# Patient Record
Sex: FEMALE | Race: WHITE | Employment: FULL TIME | URBAN - NONMETROPOLITAN AREA
[De-identification: names, ages, dates, MRNs, and addresses within clinical notes are randomized per-mention and may not be internally consistent; named-entity substitution may affect disease eponyms.]

---

## 2020-10-09 SDOH — HEALTH STABILITY: MENTAL HEALTH: HOW OFTEN DO YOU HAVE A DRINK CONTAINING ALCOHOL?: NEVER

## 2021-01-12 ENCOUNTER — VIRTUAL VISIT (OUTPATIENT)
Dept: OBGYN CLINIC | Age: 41
End: 2021-01-12
Payer: COMMERCIAL

## 2021-01-12 DIAGNOSIS — N93.9 ABNORMAL UTERINE AND VAGINAL BLEEDING, UNSPECIFIED: Primary | ICD-10-CM

## 2021-01-12 PROCEDURE — 99442 PR PHYS/QHP TELEPHONE EVALUATION 11-20 MIN: CPT | Performed by: NURSE PRACTITIONER

## 2021-01-12 NOTE — PROGRESS NOTES
Robyn Wagoner is a 47 y.o. female evaluated via telephone on 1/12/2021. Consent:  She and/or health care decision maker is aware that that she may receive a bill for this telephone service, depending on her insurance coverage, and has provided verbal consent to proceed: Yes      Documentation:  I communicated with the patient and/or health care decision maker about prolonged, heavy menses. Details of this discussion including any medical advice provided: Patient reports prolonged, heavy menses since delivering in April. States this past month she only had 4 days where she did not have some sort of bleeding. Amount of bleeding varies from dark brown spotting to saturating super plus tampons every 3 hours. Reports several occassions where she has had to miss work due to bleeding/cramping. Patient reports she had previously discussed Cesar Watts with Dr. Praveen Dozier and would like to move forth with surgery at this time. Patient is agreeable to proceed with pelvic usn and follow up after resolution of Covid. I affirm this is a Patient Initiated Episode with a Patient who has not had a related appointment within my department in the past 7 days or scheduled within the next 24 hours.     Patient identification was verified at the start of the visit: Yes    Total Time: minutes: 11-20 minutes    Note: not billable if this call serves to triage the patient into an appointment for the relevant concern      Deepika Aguilar

## 2022-08-22 ENCOUNTER — OFFICE VISIT (OUTPATIENT)
Dept: OBGYN CLINIC | Age: 42
End: 2022-08-22
Payer: COMMERCIAL

## 2022-08-22 VITALS — SYSTOLIC BLOOD PRESSURE: 124 MMHG | WEIGHT: 219.5 LBS | DIASTOLIC BLOOD PRESSURE: 72 MMHG

## 2022-08-22 DIAGNOSIS — N93.9 ABNORMAL UTERINE AND VAGINAL BLEEDING, UNSPECIFIED: Primary | ICD-10-CM

## 2022-08-22 PROCEDURE — 99213 OFFICE O/P EST LOW 20 MIN: CPT | Performed by: NURSE PRACTITIONER

## 2022-08-22 RX ORDER — DEXTROAMPHETAMINE SACCHARATE, AMPHETAMINE ASPARTATE MONOHYDRATE, DEXTROAMPHETAMINE SULFATE AND AMPHETAMINE SULFATE 2.5; 2.5; 2.5; 2.5 MG/1; MG/1; MG/1; MG/1
CAPSULE, EXTENDED RELEASE ORAL
COMMUNITY
Start: 2022-08-05

## 2022-08-22 NOTE — PROGRESS NOTES
PROBLEM VISIT     Date of service: 2022    Jurgen Lewis  Is a 38 y.o. female    PT's PCP is: Michelle Currie     : 1980                                             Subjective:       Patient's last menstrual period was 2022. OB History    Para Term  AB Living   8 6 1     5   SAB IAB Ectopic Molar Multiple Live Births             5      # Outcome Date GA Lbr Abdifatah/2nd Weight Sex Delivery Anes PTL Lv   8 Term      CS-LTranv   SAM   7             6             5 Para            4 Para            3 Para            2 Para            1 Para                 Social History     Tobacco Use   Smoking Status Never   Smokeless Tobacco Never        Social History     Substance and Sexual Activity   Alcohol Use Never       Allergies: Tree nut [macadamia nut oil], Adhesive tape, Azithromycin, Ceftriaxone, Aspirin, Influenza vaccines, Morphine, Sesame oil, Toradol [ketorolac tromethamine], Augmentin [amoxicillin-pot clavulanate], Ciprofloxacin, Hydrocodone-acetaminophen, and Zosyn [piperacillin sod-tazobactam so]      Current Outpatient Medications:     Multiple Vitamin (MULTIVITAMIN ADULT PO), Take by mouth, Disp: , Rfl:     amphetamine-dextroamphetamine (ADDERALL XR) 10 MG extended release capsule, TAKE 1 CAPSULE BY MOUTH IN THE MORNING AS NEEDED, Disp: , Rfl:     Social History     Substance and Sexual Activity   Sexual Activity Yes    Partners: Male     Chief Complaint   Patient presents with    Menstrual Problem     C/O prolonged, heavy bleeding. LMP lasted 20 days. Discuss options. PE:  Vital Signs  Blood pressure 124/72, weight 219 lb 8 oz (99.6 kg), last menstrual period 2022. HPI: Patient presents today with complaints of prolonged, heavy menses. Reports LMP lasted 20 days; flow was heavy the duration with clotting and cramping. Reports menstrual change have been present since last delivery in early .      PT denies fever, chills, nausea and vomiting       Review of Systems   Constitutional: Negative. Respiratory:  Negative for shortness of breath. Cardiovascular:  Negative for palpitations. Genitourinary:  Positive for menstrual problem. Negative for difficulty urinating, dyspareunia, dysuria, frequency, pelvic pain, vaginal bleeding, vaginal discharge and vaginal pain. Physical Exam  Constitutional:       Appearance: Normal appearance. HENT:      Head: Normocephalic. Pulmonary:      Effort: Pulmonary effort is normal.   Musculoskeletal:         General: Normal range of motion. Neurological:      General: No focal deficit present. Mental Status: She is alert. Psychiatric:         Mood and Affect: Mood normal.         Behavior: Behavior normal.       Assessment and Plan          Diagnosis Orders   1. Abnormal uterine and vaginal bleeding, unspecified            reviewed possible differentials. Discussed usn and follow up with possible need for endometrial bx due to prolonged nature of bleeding and age. Discussed options for management of menses (states she discussed TLH in the past with Dr. Criselda Bumpers)- patient is not interested in hormonal cycle control. Ablation and TLH pamphlet provided. Will consider options. I am having Faraz Acevedo. Masters maintain her amphetamine-dextroamphetamine and Multiple Vitamin (MULTIVITAMIN ADULT PO). Return for gyn US. There are no Patient Instructions on file for this visit.     Time spent 20 minutes      DEEPIKA Campa NP,8/29/2022 7:49 AM

## 2022-08-29 ASSESSMENT — ENCOUNTER SYMPTOMS: SHORTNESS OF BREATH: 0

## 2022-08-31 ENCOUNTER — OFFICE VISIT (OUTPATIENT)
Dept: OBGYN CLINIC | Age: 42
End: 2022-08-31
Payer: COMMERCIAL

## 2022-08-31 VITALS
WEIGHT: 220 LBS | SYSTOLIC BLOOD PRESSURE: 110 MMHG | HEIGHT: 63 IN | DIASTOLIC BLOOD PRESSURE: 79 MMHG | BODY MASS INDEX: 38.98 KG/M2

## 2022-08-31 DIAGNOSIS — Z80.49 FAMILY HISTORY OF UTERINE CANCER: ICD-10-CM

## 2022-08-31 DIAGNOSIS — N80.03 ADENOMYOSIS OF UTERUS: ICD-10-CM

## 2022-08-31 DIAGNOSIS — N93.9 ABNORMAL UTERINE AND VAGINAL BLEEDING, UNSPECIFIED: Primary | ICD-10-CM

## 2022-08-31 DIAGNOSIS — N94.6 DYSMENORRHEA: ICD-10-CM

## 2022-08-31 PROCEDURE — 99213 OFFICE O/P EST LOW 20 MIN: CPT | Performed by: NURSE PRACTITIONER

## 2022-08-31 ASSESSMENT — ENCOUNTER SYMPTOMS: SHORTNESS OF BREATH: 0

## 2022-09-01 ENCOUNTER — HOSPITAL ENCOUNTER (OUTPATIENT)
Age: 42
Setting detail: SPECIMEN
Discharge: HOME | End: 2022-09-01

## 2022-09-01 ENCOUNTER — PROCEDURE VISIT (OUTPATIENT)
Dept: OBGYN CLINIC | Age: 42
End: 2022-09-01
Payer: COMMERCIAL

## 2022-09-01 DIAGNOSIS — N93.9 ABNORMAL UTERINE AND VAGINAL BLEEDING, UNSPECIFIED: Primary | ICD-10-CM

## 2022-09-01 LAB
CONTROL: NORMAL
PREGNANCY TEST URINE, POC: NEGATIVE

## 2022-09-01 PROCEDURE — 58100 BIOPSY OF UTERUS LINING: CPT | Performed by: NURSE PRACTITIONER

## 2022-09-01 PROCEDURE — 81025 URINE PREGNANCY TEST: CPT | Performed by: NURSE PRACTITIONER

## 2022-09-01 NOTE — PROGRESS NOTES
PROBLEM VISIT     Date of service: 2022    Tyrese Worrell  Is a 38 y.o. female    PT's PCP is: Jarvis Mclean     : 1980                                             Subjective:       Patient's last menstrual period was 2022. OB History    Para Term  AB Living   8 6 1     5   SAB IAB Ectopic Molar Multiple Live Births             5      # Outcome Date GA Lbr Abdifatah/2nd Weight Sex Delivery Anes PTL Lv   8 Term      CS-LTranv   SAM   7             6             5 Para            4 Para            3 Para            2 Para            1 Para                 Social History     Tobacco Use   Smoking Status Never   Smokeless Tobacco Never        Social History     Substance and Sexual Activity   Alcohol Use Never       Allergies: Tree nut [macadamia nut oil], Adhesive tape, Azithromycin, Ceftriaxone, Aspirin, Influenza vaccines, Morphine, Sesame oil, Toradol [ketorolac tromethamine], Augmentin [amoxicillin-pot clavulanate], Ciprofloxacin, Hydrocodone-acetaminophen, and Zosyn [piperacillin sod-tazobactam so]      Current Outpatient Medications:     amphetamine-dextroamphetamine (ADDERALL XR) 10 MG extended release capsule, TAKE 1 CAPSULE BY MOUTH IN THE MORNING AS NEEDED, Disp: , Rfl:     Multiple Vitamin (MULTIVITAMIN ADULT PO), Take by mouth, Disp: , Rfl:     Social History     Substance and Sexual Activity   Sexual Activity Yes    Partners: Male     Chief Complaint   Patient presents with    Follow-up     Follow up usn for AUB. Reports severe cramping and taking Motrin around the clock today. PE:  Vital Signs  Blood pressure 110/79, height 5' 3\" (1.6 m), weight 220 lb (99.8 kg), last menstrual period 2022. HPI: Patient here following ultrasound for AUB. Reports started menses again today; only had 9 days of no bleeding in between menses. Significant cramping associated with menses.      T denies fever, chills, nausea and vomiting       Review of Systems Constitutional: Negative. Respiratory:  Negative for shortness of breath. Cardiovascular:  Negative for chest pain and palpitations. Genitourinary:  Positive for menstrual problem. Negative for difficulty urinating, frequency, vaginal discharge and vaginal pain. Neurological:  Negative for syncope and light-headedness. Physical Exam  Constitutional:       Appearance: Normal appearance. HENT:      Head: Normocephalic. Pulmonary:      Effort: Pulmonary effort is normal.   Musculoskeletal:         General: Normal range of motion. Neurological:      General: No focal deficit present. Mental Status: She is alert. Psychiatric:         Mood and Affect: Mood normal.         Behavior: Behavior normal.     Uterus measures: 10.3 x 6.9  x 7.4 cm, heterogenous appearing uterus  Endometrium measures: 8.5 mm  Right ovary appears WNL  Left ovary appears WNL    Assessment and Plan          Diagnosis Orders   1. Abnormal uterine and vaginal bleeding, unspecified        2. Dysmenorrhea        3. Adenomyosis of uterus        4. Family history of uterine cancer            ultrasound reviewed with patient   Endometrial bx recommended due to bleeding pattern, age and family hx of uterine cancer. Patient originally was leaning towards ablation for cycle control but now desires TLH without BSO as she is concerned with family history of cancer. I am having Real Bump. Masters maintain her amphetamine-dextroamphetamine and Multiple Vitamin (MULTIVITAMIN ADULT PO). Return for endo bx-. There are no Patient Instructions on file for this visit.     Time spent 20 minutes      DEEPIKA Saucedo NP,8/31/2022 9:32 PM

## 2022-09-01 NOTE — PROGRESS NOTES
Vero Rivera, A 38y.o. year old female presents to the office for an Endometrial Biopsy. LMP 08/31/2022     Reason For Biopsy: AUB    EMB: performed today - procedure explained to pt - verbal consent obtained     Procedure:      A speculum was paced in vaginal canal and the cervix was visualized. The cervix was then prepped with betadine x 3. A tenaculum was used. The uterus was measured to be 10 cm. Pipette used to obtain specimen and the specimen will be sent for pathology. A large amount of tissue was collected. Pt tolerated the procedure well. Assessment:          Plan:   Warning signs reviewed such as cramping and spotting. Encouraged to use ibuprofen 800 mg TID PRN and heat for comfort. Will call patient with biopsy results.    Anticipate follow up with: results and TLH

## 2022-09-06 LAB — SURGICAL PATHOLOGY REPORT: NORMAL

## 2022-09-19 ENCOUNTER — TELEPHONE (OUTPATIENT)
Dept: OBGYN CLINIC | Age: 42
End: 2022-09-19

## 2022-09-19 DIAGNOSIS — Z01.818 PRE-OP TESTING: Primary | ICD-10-CM

## 2022-09-19 DIAGNOSIS — N93.9 ABNORMAL UTERINE AND VAGINAL BLEEDING, UNSPECIFIED: ICD-10-CM

## 2022-09-19 DIAGNOSIS — N94.6 DYSMENORRHEA: ICD-10-CM

## 2022-09-19 DIAGNOSIS — N80.03 ADENOMYOSIS OF UTERUS: ICD-10-CM

## 2022-09-19 DIAGNOSIS — Z80.49 FAMILY HISTORY OF UTERINE CANCER: ICD-10-CM

## 2022-09-19 NOTE — TELEPHONE ENCOUNTER
Paperwork faxed to [facility] to initiate a PA for patient's upcoming hysterectomy.

## 2022-09-19 NOTE — TELEPHONE ENCOUNTER
I spoke to patient on 9/14 regarding hysterectomy expectations and recovery. She is scheduled for a RA TLH, poss BSO, poss Lap Colpo at [facility] on 12/12/2022. She is aware that a nurse from [facility] will call her to complete a phone interview and arrange COVID testing if needed. Patient has received the COVID vaccine. 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 She will plan for her  to take two weeks off work to help with the 5 children. He works for the [employer] and will have [facility] papers sent to the office. Patient will come in to see Dr. Abdiel Burciaga prior to surgery and will get labs at that visit. We will also follow up 2 and 6 weeks after surgery. Appointments scheduled. Patient verbalized understanding, no further questions/concerns voiced.

## 2022-11-29 RX ORDER — PSEUDOEPHEDRINE HCL 30 MG
30 TABLET ORAL EVERY 4 HOURS PRN
COMMUNITY

## 2022-11-29 RX ORDER — CETIRIZINE HYDROCHLORIDE 10 MG/1
10 TABLET ORAL AS NEEDED
COMMUNITY

## 2022-11-29 NOTE — PROGRESS NOTES
Patient instructed per phone interview on the pre-operative, intra-operative, and post-operative process, as well as NPO status. Patient will take morning medications with sip of water only. Pre-operative instruction sheet reviewed as well as G skin prep instructions. Verbalizes understanding. Patient states her \"blood pressure bottoms out with surgery and I wake up rough. \" Encouraged patient to discuss concerns with anesthesia the day of surgery so they can prepare for her blood pressure and aide her in waking up safely. Verbalizes understanding.

## 2022-12-02 ENCOUNTER — PATIENT MESSAGE (OUTPATIENT)
Dept: OBGYN CLINIC | Age: 42
End: 2022-12-02

## 2022-12-07 ENCOUNTER — OFFICE VISIT (OUTPATIENT)
Dept: OBGYN CLINIC | Age: 42
End: 2022-12-07
Payer: COMMERCIAL

## 2022-12-07 ENCOUNTER — HOSPITAL ENCOUNTER (OUTPATIENT)
Age: 42
Setting detail: SPECIMEN
Discharge: HOME | End: 2022-12-07

## 2022-12-07 VITALS
HEIGHT: 63 IN | DIASTOLIC BLOOD PRESSURE: 74 MMHG | BODY MASS INDEX: 38.98 KG/M2 | SYSTOLIC BLOOD PRESSURE: 110 MMHG | WEIGHT: 220 LBS

## 2022-12-07 DIAGNOSIS — Z01.818 PRE-OP TESTING: ICD-10-CM

## 2022-12-07 DIAGNOSIS — N93.9 ABNORMAL UTERINE AND VAGINAL BLEEDING, UNSPECIFIED: ICD-10-CM

## 2022-12-07 DIAGNOSIS — N92.1 MENORRHAGIA WITH IRREGULAR CYCLE: ICD-10-CM

## 2022-12-07 DIAGNOSIS — N94.6 DYSMENORRHEA: ICD-10-CM

## 2022-12-07 DIAGNOSIS — N80.03 ADENOMYOSIS OF UTERUS: ICD-10-CM

## 2022-12-07 DIAGNOSIS — N94.6 DYSMENORRHEA: Primary | ICD-10-CM

## 2022-12-07 DIAGNOSIS — Z80.49 FAMILY HISTORY OF UTERINE CANCER: ICD-10-CM

## 2022-12-07 LAB
ABSOLUTE EOS #: 0.2 K/UL (ref 0–0.44)
ABSOLUTE IMMATURE GRANULOCYTE: <0.03 K/UL (ref 0–0.3)
ABSOLUTE LYMPH #: 2.14 K/UL (ref 1.1–3.7)
ABSOLUTE MONO #: 0.54 K/UL (ref 0.1–1.2)
BASOPHILS # BLD: 1 % (ref 0–2)
BASOPHILS ABSOLUTE: 0.04 K/UL (ref 0–0.2)
EOSINOPHILS RELATIVE PERCENT: 3 % (ref 1–4)
HCG QUALITATIVE: NEGATIVE
HCT VFR BLD CALC: 37.8 % (ref 36.3–47.1)
HEMOGLOBIN: 11.2 G/DL (ref 11.9–15.1)
IMMATURE GRANULOCYTES: 0 %
LYMPHOCYTES # BLD: 29 % (ref 24–43)
MCH RBC QN AUTO: 24 PG (ref 25.2–33.5)
MCHC RBC AUTO-ENTMCNC: 29.6 G/DL (ref 28.4–34.8)
MCV RBC AUTO: 80.9 FL (ref 82.6–102.9)
MONOCYTES # BLD: 7 % (ref 3–12)
NRBC AUTOMATED: 0 PER 100 WBC
PDW BLD-RTO: 14.8 % (ref 11.8–14.4)
PLATELET # BLD: 362 K/UL (ref 138–453)
PMV BLD AUTO: 10.9 FL (ref 8.1–13.5)
RBC # BLD: 4.67 M/UL (ref 3.95–5.11)
RBC # BLD: ABNORMAL 10*6/UL
SEG NEUTROPHILS: 60 % (ref 36–65)
SEGMENTED NEUTROPHILS ABSOLUTE COUNT: 4.4 K/UL (ref 1.5–8.1)
WBC # BLD: 7.3 K/UL (ref 3.5–11.3)

## 2022-12-07 PROCEDURE — 99213 OFFICE O/P EST LOW 20 MIN: CPT | Performed by: OBSTETRICS & GYNECOLOGY

## 2022-12-07 RX ORDER — DEXTROAMPHETAMINE SACCHARATE, AMPHETAMINE ASPARTATE MONOHYDRATE, DEXTROAMPHETAMINE SULFATE AND AMPHETAMINE SULFATE 3.75; 3.75; 3.75; 3.75 MG/1; MG/1; MG/1; MG/1
CAPSULE, EXTENDED RELEASE ORAL
COMMUNITY
Start: 2022-11-14

## 2022-12-07 NOTE — PROGRESS NOTES
DATE OF VISIT:  12/7/22    PATIENT NAME:  Jerry De La Paz     YOB: 1980    REASON FOR VISIT:    Chief Complaint   Patient presents with    Pre-op Exam     Patient is scheduled on 12- for RA TLH, poss BSO, poss lap colpopexy. Menorrhagia     Pt c/o painful cycles and states has been bleeding more times then not during  the month. Irregular Menses     Pt states cycles can be up  to 38 days between each one. HISTORY OF PRESENT ILLNESS:  Pt with heavy and irregular bleeding; painful cycles and states she is bleeding more than she is not; usn showed adenomyotic approximately 11 cm uterus; desires definitive treatment; disc'd RA TLH/BSO; r/b/a reviewed; restrictions and recovery disc'd      Patient's last menstrual period was 11/25/2022. Vitals:    12/07/22 1454   BP: 110/74   Site: Right Upper Arm   Position: Sitting   Weight: 220 lb (99.8 kg)   Height: 5' 3\" (1.6 m)     Body mass index is 38.97 kg/m². Allergies   Allergen Reactions    Tree Nut [Macadamia Nut Oil] Anaphylaxis    Adhesive Tape     Azithromycin Hives    Ceftriaxone     Aspirin     Influenza Vaccines Hives    Morphine     Sesame Oil Hives    Toradol [Ketorolac Tromethamine]     Augmentin [Amoxicillin-Pot Clavulanate] Rash    Ciprofloxacin Hives    Hydrocodone-Acetaminophen Hives    Zosyn [Piperacillin Sod-Tazobactam So] Rash     Current Outpatient Medications   Medication Sig Dispense Refill    amphetamine-dextroamphetamine (ADDERALL XR) 15 MG extended release capsule TAKE 1 CAPSULE BY MOUTH TWICE A DAY      pseudoephedrine (SUDAFED) 30 MG tablet Take 30 mg by mouth every 4 hours as needed for Congestion      cetirizine (ZYRTEC) 10 MG tablet Take 10 mg by mouth as needed for Allergies      Multiple Vitamin (MULTIVITAMIN ADULT PO) Take by mouth       No current facility-administered medications for this visit.      Social History     Socioeconomic History    Marital status:      Spouse name: None    Number of children: None    Years of education: None    Highest education level: None   Tobacco Use    Smoking status: Never    Smokeless tobacco: Never   Vaping Use    Vaping Use: Never used   Substance and Sexual Activity    Alcohol use: Never    Drug use: Never    Sexual activity: Yes     Partners: Male       REVIEW OF SYSTEMS:  Review of Systems   Constitutional:  Negative for chills and fever. Genitourinary:  Positive for menstrual problem and pelvic pain. Negative for dysuria and vaginal discharge. PHYSICAL EXAM:  /74 (Site: Right Upper Arm, Position: Sitting)   Ht 5' 3\" (1.6 m)   Wt 220 lb (99.8 kg)   LMP 11/25/2022   BMI 38.97 kg/m²   Physical Exam  Constitutional:       Appearance: Normal appearance. HENT:      Head: Normocephalic and atraumatic. Eyes:      Extraocular Movements: Extraocular movements intact. Pupils: Pupils are equal, round, and reactive to light. Cardiovascular:      Rate and Rhythm: Normal rate. Pulmonary:      Effort: Pulmonary effort is normal.   Musculoskeletal:         General: Normal range of motion. Neurological:      Mental Status: She is alert and oriented to person, place, and time. Skin:     General: Skin is warm and dry. Psychiatric:         Mood and Affect: Mood normal.         Behavior: Behavior normal.     The patient, Padilla Strickland is a 38 y.o. female, was seen with a total time spent of 20 minutes for the visit on this date of service by the E/M provider. The time component had both face to face and non face to face time spent in determining the total time component. Counseling and education regarding her diagnosis listed below and her options regarding those diagnoses were also included in determining her time component. Diagnosis Orders   1. Dysmenorrhea        2. Adenomyosis of uterus        3.  Menorrhagia with irregular cycle             The patient had her preventative health maintenance recommendations and follow-up reviewed with her at the completion of her visit. ASSESSMENT:      1. Dysmenorrhea    2. Adenomyosis of uterus    3. Menorrhagia with irregular cycle        PLAN:  No orders of the defined types were placed in this encounter. Return in about 1 week (around 12/14/2022) for DREW VEGA/victorino BSO.        Electronically signed by Juanita Barragan DO on 12/07/22

## 2022-12-08 ENCOUNTER — TELEPHONE (OUTPATIENT)
Dept: OBGYN CLINIC | Age: 42
End: 2022-12-08

## 2022-12-09 ENCOUNTER — ANESTHESIA EVENT (OUTPATIENT)
Dept: OPERATING ROOM | Age: 42
End: 2022-12-09
Payer: COMMERCIAL

## 2022-12-12 ENCOUNTER — TELEPHONE (OUTPATIENT)
Dept: OBGYN CLINIC | Age: 42
End: 2022-12-12

## 2022-12-12 ENCOUNTER — ANESTHESIA (OUTPATIENT)
Dept: OPERATING ROOM | Age: 42
End: 2022-12-12
Payer: COMMERCIAL

## 2022-12-12 ENCOUNTER — PATIENT MESSAGE (OUTPATIENT)
Dept: OBGYN CLINIC | Age: 42
End: 2022-12-12

## 2022-12-12 ENCOUNTER — TELEPHONE (OUTPATIENT)
Dept: OBGYN | Age: 42
End: 2022-12-12

## 2022-12-12 ENCOUNTER — NURSE TRIAGE (OUTPATIENT)
Dept: OTHER | Age: 42
End: 2022-12-12

## 2022-12-12 ENCOUNTER — HOSPITAL ENCOUNTER (OUTPATIENT)
Age: 42
Setting detail: OUTPATIENT SURGERY
Discharge: HOME | End: 2022-12-12
Attending: OBSTETRICS & GYNECOLOGY | Admitting: OBSTETRICS & GYNECOLOGY
Payer: COMMERCIAL

## 2022-12-12 VITALS
OXYGEN SATURATION: 98 % | HEIGHT: 63 IN | WEIGHT: 199.8 LBS | SYSTOLIC BLOOD PRESSURE: 114 MMHG | DIASTOLIC BLOOD PRESSURE: 78 MMHG | HEART RATE: 76 BPM | TEMPERATURE: 97.3 F | BODY MASS INDEX: 35.4 KG/M2 | RESPIRATION RATE: 18 BRPM

## 2022-12-12 DIAGNOSIS — N92.0 MENORRHAGIA WITH REGULAR CYCLE: ICD-10-CM

## 2022-12-12 DIAGNOSIS — G89.18 POST-OP PAIN: Primary | ICD-10-CM

## 2022-12-12 PROBLEM — N73.6 UTERINE ADHESIONS: Status: ACTIVE | Noted: 2022-12-12

## 2022-12-12 PROBLEM — K66.0 ADHESION OF OMENTUM: Status: ACTIVE | Noted: 2022-12-12

## 2022-12-12 PROBLEM — N73.6 PELVIC PERITONEAL ADHESIONS, FEMALE: Status: ACTIVE | Noted: 2022-12-12

## 2022-12-12 PROBLEM — N32.89 BLADDER ADHESIONS: Status: ACTIVE | Noted: 2022-12-12

## 2022-12-12 PROCEDURE — 58571 TLH W/T/O 250 G OR LESS: CPT

## 2022-12-12 PROCEDURE — 6370000000 HC RX 637 (ALT 250 FOR IP): Performed by: NURSE ANESTHETIST, CERTIFIED REGISTERED

## 2022-12-12 PROCEDURE — 3600000019 HC SURGERY ROBOT ADDTL 15MIN: Performed by: OBSTETRICS & GYNECOLOGY

## 2022-12-12 PROCEDURE — 6360000002 HC RX W HCPCS: Performed by: NURSE ANESTHETIST, CERTIFIED REGISTERED

## 2022-12-12 PROCEDURE — 7100000000 HC PACU RECOVERY - FIRST 15 MIN: Performed by: OBSTETRICS & GYNECOLOGY

## 2022-12-12 PROCEDURE — 3600000009 HC SURGERY ROBOT BASE: Performed by: OBSTETRICS & GYNECOLOGY

## 2022-12-12 PROCEDURE — 7100000001 HC PACU RECOVERY - ADDTL 15 MIN: Performed by: OBSTETRICS & GYNECOLOGY

## 2022-12-12 PROCEDURE — 58571 TLH W/T/O 250 G OR LESS: CPT | Performed by: OBSTETRICS & GYNECOLOGY

## 2022-12-12 PROCEDURE — 6360000002 HC RX W HCPCS

## 2022-12-12 PROCEDURE — 88307 TISSUE EXAM BY PATHOLOGIST: CPT

## 2022-12-12 PROCEDURE — 2709999900 HC NON-CHARGEABLE SUPPLY: Performed by: OBSTETRICS & GYNECOLOGY

## 2022-12-12 PROCEDURE — S2900 ROBOTIC SURGICAL SYSTEM: HCPCS | Performed by: OBSTETRICS & GYNECOLOGY

## 2022-12-12 PROCEDURE — 7100000010 HC PHASE II RECOVERY - FIRST 15 MIN: Performed by: OBSTETRICS & GYNECOLOGY

## 2022-12-12 PROCEDURE — 64488 TAP BLOCK BI INJECTION: CPT | Performed by: NURSE ANESTHETIST, CERTIFIED REGISTERED

## 2022-12-12 PROCEDURE — 7100000011 HC PHASE II RECOVERY - ADDTL 15 MIN: Performed by: OBSTETRICS & GYNECOLOGY

## 2022-12-12 PROCEDURE — 3700000001 HC ADD 15 MINUTES (ANESTHESIA): Performed by: OBSTETRICS & GYNECOLOGY

## 2022-12-12 PROCEDURE — A4216 STERILE WATER/SALINE, 10 ML: HCPCS | Performed by: NURSE ANESTHETIST, CERTIFIED REGISTERED

## 2022-12-12 PROCEDURE — 2580000003 HC RX 258: Performed by: NURSE ANESTHETIST, CERTIFIED REGISTERED

## 2022-12-12 PROCEDURE — 2500000003 HC RX 250 WO HCPCS

## 2022-12-12 PROCEDURE — 3700000000 HC ANESTHESIA ATTENDED CARE: Performed by: OBSTETRICS & GYNECOLOGY

## 2022-12-12 PROCEDURE — 2500000003 HC RX 250 WO HCPCS: Performed by: NURSE ANESTHETIST, CERTIFIED REGISTERED

## 2022-12-12 PROCEDURE — 58660 LAPAROSCOPY LYSIS: CPT

## 2022-12-12 RX ORDER — GLYCOPYRROLATE 0.2 MG/ML
INJECTION INTRAMUSCULAR; INTRAVENOUS PRN
Status: DISCONTINUED | OUTPATIENT
Start: 2022-12-12 | End: 2022-12-12 | Stop reason: SDUPTHER

## 2022-12-12 RX ORDER — ONDANSETRON 4 MG/1
4 TABLET, FILM COATED ORAL 3 TIMES DAILY PRN
Qty: 15 TABLET | Refills: 0 | Status: SHIPPED | OUTPATIENT
Start: 2022-12-12

## 2022-12-12 RX ORDER — KETAMINE HCL 50MG/ML(1)
SYRINGE (ML) INTRAVENOUS PRN
Status: DISCONTINUED | OUTPATIENT
Start: 2022-12-12 | End: 2022-12-12 | Stop reason: SDUPTHER

## 2022-12-12 RX ORDER — NEOSTIGMINE METHYLSULFATE 1 MG/ML
INJECTION, SOLUTION INTRAVENOUS PRN
Status: DISCONTINUED | OUTPATIENT
Start: 2022-12-12 | End: 2022-12-12 | Stop reason: SDUPTHER

## 2022-12-12 RX ORDER — METRONIDAZOLE 500 MG/100ML
500 INJECTION, SOLUTION INTRAVENOUS
Status: COMPLETED | OUTPATIENT
Start: 2022-12-12 | End: 2022-12-12

## 2022-12-12 RX ORDER — ACETAMINOPHEN 325 MG/1
650 TABLET ORAL ONCE
Status: COMPLETED | OUTPATIENT
Start: 2022-12-12 | End: 2022-12-12

## 2022-12-12 RX ORDER — SODIUM CHLORIDE 0.9 % (FLUSH) 0.9 %
5-40 SYRINGE (ML) INJECTION EVERY 12 HOURS SCHEDULED
Status: DISCONTINUED | OUTPATIENT
Start: 2022-12-12 | End: 2022-12-12 | Stop reason: HOSPADM

## 2022-12-12 RX ORDER — LIDOCAINE HYDROCHLORIDE 20 MG/ML
INJECTION, SOLUTION EPIDURAL; INFILTRATION; INTRACAUDAL; PERINEURAL PRN
Status: DISCONTINUED | OUTPATIENT
Start: 2022-12-12 | End: 2022-12-12 | Stop reason: SDUPTHER

## 2022-12-12 RX ORDER — PROCHLORPERAZINE EDISYLATE 5 MG/ML
5 INJECTION INTRAMUSCULAR; INTRAVENOUS
Status: COMPLETED | OUTPATIENT
Start: 2022-12-12 | End: 2022-12-12

## 2022-12-12 RX ORDER — SODIUM CHLORIDE, SODIUM LACTATE, POTASSIUM CHLORIDE, CALCIUM CHLORIDE 600; 310; 30; 20 MG/100ML; MG/100ML; MG/100ML; MG/100ML
INJECTION, SOLUTION INTRAVENOUS CONTINUOUS
Status: DISCONTINUED | OUTPATIENT
Start: 2022-12-12 | End: 2022-12-12 | Stop reason: HOSPADM

## 2022-12-12 RX ORDER — SODIUM CHLORIDE 9 MG/ML
INJECTION INTRAVENOUS PRN
Status: DISCONTINUED | OUTPATIENT
Start: 2022-12-12 | End: 2022-12-12 | Stop reason: SDUPTHER

## 2022-12-12 RX ORDER — PROPOFOL 10 MG/ML
INJECTION, EMULSION INTRAVENOUS PRN
Status: DISCONTINUED | OUTPATIENT
Start: 2022-12-12 | End: 2022-12-12 | Stop reason: SDUPTHER

## 2022-12-12 RX ORDER — DIMENHYDRINATE 50 MG/1
50 TABLET ORAL ONCE
Status: COMPLETED | OUTPATIENT
Start: 2022-12-12 | End: 2022-12-12

## 2022-12-12 RX ORDER — ONDANSETRON 2 MG/ML
INJECTION INTRAMUSCULAR; INTRAVENOUS PRN
Status: DISCONTINUED | OUTPATIENT
Start: 2022-12-12 | End: 2022-12-12 | Stop reason: SDUPTHER

## 2022-12-12 RX ORDER — CIPROFLOXACIN 2 MG/ML
400 INJECTION, SOLUTION INTRAVENOUS
Status: COMPLETED | OUTPATIENT
Start: 2022-12-12 | End: 2022-12-12

## 2022-12-12 RX ORDER — KETOROLAC TROMETHAMINE 30 MG/ML
INJECTION, SOLUTION INTRAMUSCULAR; INTRAVENOUS PRN
Status: DISCONTINUED | OUTPATIENT
Start: 2022-12-12 | End: 2022-12-12 | Stop reason: SDUPTHER

## 2022-12-12 RX ORDER — DIPHENHYDRAMINE HYDROCHLORIDE 50 MG/ML
12.5 INJECTION INTRAMUSCULAR; INTRAVENOUS ONCE
Status: COMPLETED | OUTPATIENT
Start: 2022-12-12 | End: 2022-12-12

## 2022-12-12 RX ORDER — DEXAMETHASONE SODIUM PHOSPHATE 10 MG/ML
INJECTION, SOLUTION INTRAMUSCULAR; INTRAVENOUS PRN
Status: DISCONTINUED | OUTPATIENT
Start: 2022-12-12 | End: 2022-12-12 | Stop reason: SDUPTHER

## 2022-12-12 RX ORDER — SODIUM CHLORIDE 0.9 % (FLUSH) 0.9 %
5-40 SYRINGE (ML) INJECTION PRN
Status: DISCONTINUED | OUTPATIENT
Start: 2022-12-12 | End: 2022-12-12 | Stop reason: HOSPADM

## 2022-12-12 RX ORDER — FENTANYL CITRATE 50 UG/ML
25 INJECTION, SOLUTION INTRAMUSCULAR; INTRAVENOUS EVERY 5 MIN PRN
Status: DISCONTINUED | OUTPATIENT
Start: 2022-12-12 | End: 2022-12-12 | Stop reason: HOSPADM

## 2022-12-12 RX ORDER — ENOXAPARIN SODIUM 100 MG/ML
40 INJECTION SUBCUTANEOUS ONCE
Status: COMPLETED | OUTPATIENT
Start: 2022-12-12 | End: 2022-12-12

## 2022-12-12 RX ORDER — SODIUM CHLORIDE 9 MG/ML
INJECTION, SOLUTION INTRAVENOUS PRN
Status: DISCONTINUED | OUTPATIENT
Start: 2022-12-12 | End: 2022-12-12 | Stop reason: HOSPADM

## 2022-12-12 RX ORDER — IBUPROFEN 800 MG/1
800 TABLET ORAL 2 TIMES DAILY PRN
Qty: 20 TABLET | Refills: 0 | Status: SHIPPED | OUTPATIENT
Start: 2022-12-12

## 2022-12-12 RX ORDER — FENTANYL CITRATE 50 UG/ML
INJECTION, SOLUTION INTRAMUSCULAR; INTRAVENOUS PRN
Status: DISCONTINUED | OUTPATIENT
Start: 2022-12-12 | End: 2022-12-12 | Stop reason: SDUPTHER

## 2022-12-12 RX ORDER — FENTANYL CITRATE 50 UG/ML
50 INJECTION, SOLUTION INTRAMUSCULAR; INTRAVENOUS EVERY 5 MIN PRN
Status: DISCONTINUED | OUTPATIENT
Start: 2022-12-12 | End: 2022-12-12 | Stop reason: HOSPADM

## 2022-12-12 RX ORDER — ONDANSETRON 2 MG/ML
4 INJECTION INTRAMUSCULAR; INTRAVENOUS
Status: COMPLETED | OUTPATIENT
Start: 2022-12-12 | End: 2022-12-12

## 2022-12-12 RX ORDER — ROPIVACAINE HYDROCHLORIDE 5 MG/ML
INJECTION, SOLUTION EPIDURAL; INFILTRATION; PERINEURAL PRN
Status: DISCONTINUED | OUTPATIENT
Start: 2022-12-12 | End: 2022-12-12 | Stop reason: SDUPTHER

## 2022-12-12 RX ORDER — ROCURONIUM BROMIDE 10 MG/ML
INJECTION, SOLUTION INTRAVENOUS PRN
Status: DISCONTINUED | OUTPATIENT
Start: 2022-12-12 | End: 2022-12-12 | Stop reason: SDUPTHER

## 2022-12-12 RX ORDER — PROPOFOL 10 MG/ML
INJECTION, EMULSION INTRAVENOUS CONTINUOUS PRN
Status: DISCONTINUED | OUTPATIENT
Start: 2022-12-12 | End: 2022-12-12 | Stop reason: SDUPTHER

## 2022-12-12 RX ORDER — DEXAMETHASONE SODIUM PHOSPHATE 4 MG/ML
INJECTION, SOLUTION INTRA-ARTICULAR; INTRALESIONAL; INTRAMUSCULAR; INTRAVENOUS; SOFT TISSUE PRN
Status: DISCONTINUED | OUTPATIENT
Start: 2022-12-12 | End: 2022-12-12 | Stop reason: SDUPTHER

## 2022-12-12 RX ORDER — HYDROCODONE BITARTRATE AND ACETAMINOPHEN 5; 325 MG/1; MG/1
1 TABLET ORAL EVERY 6 HOURS PRN
Qty: 10 TABLET | Refills: 0 | Status: SHIPPED | OUTPATIENT
Start: 2022-12-12 | End: 2022-12-17

## 2022-12-12 RX ORDER — OXYCODONE HYDROCHLORIDE 5 MG/1
5 TABLET ORAL
Status: COMPLETED | OUTPATIENT
Start: 2022-12-12 | End: 2022-12-12

## 2022-12-12 RX ORDER — MIDAZOLAM HYDROCHLORIDE 1 MG/ML
INJECTION INTRAMUSCULAR; INTRAVENOUS PRN
Status: DISCONTINUED | OUTPATIENT
Start: 2022-12-12 | End: 2022-12-12 | Stop reason: SDUPTHER

## 2022-12-12 RX ADMIN — Medication 10 MG: at 08:31

## 2022-12-12 RX ADMIN — PROPOFOL 150 MG: 10 INJECTION, EMULSION INTRAVENOUS at 08:01

## 2022-12-12 RX ADMIN — ROCURONIUM BROMIDE 40 MG: 10 INJECTION, SOLUTION INTRAVENOUS at 08:01

## 2022-12-12 RX ADMIN — KETOROLAC TROMETHAMINE 10 MG: 30 INJECTION, SOLUTION INTRAMUSCULAR; INTRAVENOUS at 09:48

## 2022-12-12 RX ADMIN — ROPIVACAINE HYDROCHLORIDE 56 ML: 5 INJECTION EPIDURAL; INFILTRATION; PERINEURAL at 07:24

## 2022-12-12 RX ADMIN — FENTANYL CITRATE 50 MCG: 50 INJECTION INTRAMUSCULAR; INTRAVENOUS at 10:52

## 2022-12-12 RX ADMIN — Medication 10 MG: at 08:58

## 2022-12-12 RX ADMIN — ROCURONIUM BROMIDE 10 MG: 10 INJECTION, SOLUTION INTRAVENOUS at 08:30

## 2022-12-12 RX ADMIN — PROCHLORPERAZINE EDISYLATE 5 MG: 5 INJECTION INTRAMUSCULAR; INTRAVENOUS at 11:04

## 2022-12-12 RX ADMIN — SODIUM CHLORIDE, POTASSIUM CHLORIDE, SODIUM LACTATE AND CALCIUM CHLORIDE: 600; 310; 30; 20 INJECTION, SOLUTION INTRAVENOUS at 06:50

## 2022-12-12 RX ADMIN — ONDANSETRON 4 MG: 2 INJECTION INTRAMUSCULAR; INTRAVENOUS at 10:41

## 2022-12-12 RX ADMIN — Medication 20 MG: at 08:13

## 2022-12-12 RX ADMIN — GLYCOPYRROLATE 0.6 MG: 0.2 INJECTION INTRAMUSCULAR; INTRAVENOUS at 09:47

## 2022-12-12 RX ADMIN — OXYCODONE 5 MG: 5 TABLET ORAL at 12:36

## 2022-12-12 RX ADMIN — NEOSTIGMINE METHYLSULFATE 4 MG: 1 INJECTION INTRAVENOUS at 09:47

## 2022-12-12 RX ADMIN — METRONIDAZOLE 500 MG: 500 INJECTION, SOLUTION INTRAVENOUS at 08:21

## 2022-12-12 RX ADMIN — KETOROLAC TROMETHAMINE 10 MG: 30 INJECTION, SOLUTION INTRAMUSCULAR; INTRAVENOUS at 09:52

## 2022-12-12 RX ADMIN — SODIUM CHLORIDE 26 ML: 9 INJECTION INTRAMUSCULAR; INTRAVENOUS; SUBCUTANEOUS at 07:24

## 2022-12-12 RX ADMIN — FENTANYL CITRATE 50 MCG: 50 INJECTION INTRAMUSCULAR; INTRAVENOUS at 07:16

## 2022-12-12 RX ADMIN — ROCURONIUM BROMIDE 20 MG: 10 INJECTION, SOLUTION INTRAVENOUS at 08:54

## 2022-12-12 RX ADMIN — PROPOFOL 180 MCG/KG/MIN: 10 INJECTION, EMULSION INTRAVENOUS at 07:59

## 2022-12-12 RX ADMIN — ENOXAPARIN SODIUM 40 MG: 100 INJECTION SUBCUTANEOUS at 06:42

## 2022-12-12 RX ADMIN — DIMENHYDRINATE 50 MG: 50 TABLET ORAL at 06:41

## 2022-12-12 RX ADMIN — DIPHENHYDRAMINE HYDROCHLORIDE 12.5 MG: 50 INJECTION, SOLUTION INTRAMUSCULAR; INTRAVENOUS at 11:07

## 2022-12-12 RX ADMIN — ACETAMINOPHEN 650 MG: 325 TABLET ORAL at 06:41

## 2022-12-12 RX ADMIN — LIDOCAINE HYDROCHLORIDE 50 MG: 20 INJECTION, SOLUTION EPIDURAL; INFILTRATION; INTRACAUDAL; PERINEURAL at 08:01

## 2022-12-12 RX ADMIN — DEXAMETHASONE SODIUM PHOSPHATE 20 MG: 10 INJECTION, SOLUTION INTRAMUSCULAR; INTRAVENOUS at 07:24

## 2022-12-12 RX ADMIN — MIDAZOLAM 2 MG: 1 INJECTION INTRAMUSCULAR; INTRAVENOUS at 07:16

## 2022-12-12 RX ADMIN — Medication 10 MG: at 09:16

## 2022-12-12 RX ADMIN — CIPROFLOXACIN 400 MG: 2 INJECTION, SOLUTION INTRAVENOUS at 07:14

## 2022-12-12 RX ADMIN — FENTANYL CITRATE 50 MCG: 50 INJECTION INTRAMUSCULAR; INTRAVENOUS at 08:30

## 2022-12-12 RX ADMIN — KETOROLAC TROMETHAMINE 10 MG: 30 INJECTION, SOLUTION INTRAMUSCULAR; INTRAVENOUS at 09:44

## 2022-12-12 RX ADMIN — ONDANSETRON 4 MG: 2 INJECTION INTRAMUSCULAR; INTRAVENOUS at 08:45

## 2022-12-12 RX ADMIN — DEXAMETHASONE SODIUM PHOSPHATE 4 MG: 4 INJECTION, SOLUTION INTRAMUSCULAR; INTRAVENOUS at 08:27

## 2022-12-12 ASSESSMENT — PAIN SCALES - GENERAL
PAINLEVEL_OUTOF10: 8
PAINLEVEL_OUTOF10: 10
PAINLEVEL_OUTOF10: 0

## 2022-12-12 ASSESSMENT — PAIN DESCRIPTION - FREQUENCY: FREQUENCY: CONTINUOUS

## 2022-12-12 ASSESSMENT — PAIN DESCRIPTION - DESCRIPTORS
DESCRIPTORS: CRAMPING;ACHING;SORE
DESCRIPTORS: ACHING;CRAMPING

## 2022-12-12 ASSESSMENT — PAIN DESCRIPTION - PAIN TYPE
TYPE: SURGICAL PAIN
TYPE: SURGICAL PAIN

## 2022-12-12 ASSESSMENT — PAIN DESCRIPTION - ORIENTATION: ORIENTATION: LOWER

## 2022-12-12 ASSESSMENT — PAIN DESCRIPTION - LOCATION
LOCATION: ABDOMEN
LOCATION: ABDOMEN

## 2022-12-12 NOTE — PROGRESS NOTES
Patient states pain and nausea tolerable and ready to be discharged at this time. Discharge instructions given to pt and spouse. Both verbalize understanding,deny any questions and/or concerns. Pt transfer off unit in wheelchair w/ spouse and all belongings. Discharge Criteria    Inpatients must meet Criteria 1 through 7. All other patients are either YES or N/A. If a NO is chosen then Anesthesia or Surgeon must be notified. 1.  Minimum 30 minutes after last dose of sedative medication, minimum 120 minutes after last dose of reversal agent. Yes      2. Systolic BP stable within 20 mmHg for 30 minutes & systolic BP between 90 & 275 or within 10 mmHg of baseline. Yes      3. Pulse between 60 and 100 or within 10 bpm of baseline. Yes      4. Spontaneous respiratory rate >/= 10 per minute. Yes      5. SaO2 >/= 95 or  >/= baseline. Yes      6. Able to cough and swallow or return to baseline function. Yes      7. Alert and oriented or return to baseline mental status. Yes      8. Demonstrates controlled, coordinated movements, ambulates with steady gait, or return to baseline activity function. Yes      9. Minimal or no pain or nausea, or at a level tolerable and acceptable to patient. Yes      10. Takes and retains oral fluids as allowed. Yes      11. Procedural / perioperative site stable. Minimal or no bleeding. Yes          12. If GI endoscopy procedure, minimal or no abdominal distention or passing flatus. N/A      13. Written discharge instructions and emergency telephone number provided. Yes      14. Accompanied by a responsible adult.     Yes

## 2022-12-12 NOTE — ANESTHESIA PRE PROCEDURE
Department of Anesthesiology  Preprocedure Note       Name:  Susana Boyer   Age:  38 y.o.  :  1980                                          MRN:  [id number]         Date:  2022      Surgeon: Arthur Roldan):  Ronnell Urrutia DO    Procedure: Procedure(s): HYSTERECTOMY VAGINAL LAPAROSCOPIC ROBOTIC ASSISTED- POSS BSO, POSS  LAP COLPOPEXY    Medications prior to admission:   Prior to Admission medications    Medication Sig Start Date End Date Taking? Authorizing Provider   pseudoephedrine (SUDAFED) 30 MG tablet Take 30 mg by mouth every 4 hours as needed for Congestion   Yes Historical Provider, MD   cetirizine (ZYRTEC) 10 MG tablet Take 10 mg by mouth as needed for Allergies   Yes Historical Provider, MD   amphetamine-dextroamphetamine (ADDERALL XR) 15 MG extended release capsule TAKE 1 CAPSULE BY MOUTH TWICE A DAY 22   Historical Provider, MD   Multiple Vitamin (MULTIVITAMIN ADULT PO) Take by mouth    Historical Provider, MD       Current medications:    Current Facility-Administered Medications   Medication Dose Route Frequency Provider Last Rate Last Admin    lactated ringers infusion   IntraVENous Continuous Tatiana Calvert - CRNA 100 mL/hr at 22 0650 New Bag at 22 0650    metronidazole (FLAGYL) 500 mg in 0.9% NaCl 100 mL IVPB premix  500 mg IntraVENous On Call to Debbie Abad, PATiffanieC        And    ciprofloxacin (CIPRO) IVPB 400 mg  400 mg IntraVENous On Call to Debbie 37, PA-C           Allergies:     Allergies   Allergen Reactions    Tree Nut [Macadamia Nut Oil] Anaphylaxis    Adhesive Tape     Azithromycin Hives    Ceftriaxone     Aspirin     Influenza Vaccines Hives    Morphine     Peanut (Diagnostic)      Peanut cashew almond    Sesame Oil Hives    Toradol [Ketorolac Tromethamine]     Augmentin [Amoxicillin-Pot Clavulanate] Rash    Ciprofloxacin Hives    Hydrocodone-Acetaminophen Hives    Zosyn [Piperacillin Sod-Tazobactam So] Rash Problem List:  There is no problem list on file for this patient. Past Medical History:        Diagnosis Date    Asthma     Depression     History of chlamydia     History of infertility     History of kidney stones     History of migraine     Obesity     PCOS (polycystic ovarian syndrome)        Past Surgical History:        Procedure Laterality Date     SECTION  , ,, ,,     CHOLECYSTECTOMY  2003    DILATION AND CURETTAGE OF UTERUS      DILATION AND CURETTAGE OF UTERUS      TUBAL LIGATION Bilateral        Social History:    Social History     Tobacco Use    Smoking status: Never    Smokeless tobacco: Never   Substance Use Topics    Alcohol use: Never                                Counseling given: Not Answered      Vital Signs (Current):   Vitals:    22 0620 22 0629 22 0634   BP:   109/71   Pulse:   77   Resp:   14   Temp:   36.3 °C (97.3 °F)   TempSrc:   Temporal   SpO2:   100%   Weight: 199 lb 12.8 oz (90.6 kg)     Height:  5' 3\" (1.6 m)                                               BP Readings from Last 3 Encounters:   22 109/71   22 110/74   22 110/79       NPO Status: Time of last liquid consumption: 0500 (sip water for meds)                        Time of last solid consumption:                         Date of last liquid consumption: 22                        Date of last solid food consumption: 22    BMI:   Wt Readings from Last 3 Encounters:   22 199 lb 12.8 oz (90.6 kg)   22 220 lb (99.8 kg)   22 220 lb (99.8 kg)     Body mass index is 35.39 kg/m².     CBC:   Lab Results   Component Value Date/Time    WBC 7.3 2022 03:22 PM    RBC 4.67 2022 03:22 PM    HGB 11.2 2022 03:22 PM    HCT 37.8 2022 03:22 PM    MCV 80.9 2022 03:22 PM    RDW 14.8 2022 03:22 PM     2022 03:22 PM       CMP: No results found for: NA, K, CL, CO2, BUN, CREATININE, GFRAA, AGRATIO, LABGLOM, GLUCOSE, GLU, PROT, CALCIUM, BILITOT, ALKPHOS, AST, ALT    POC Tests: No results for input(s): POCGLU, POCNA, POCK, POCCL, POCBUN, POCHEMO, POCHCT in the last 72 hours. Coags: No results found for: PROTIME, INR, APTT    HCG (If Applicable):   Lab Results   Component Value Date    PREGTESTUR Negative 09/01/2022        ABGs: No results found for: PHART, PO2ART, TPT5PLA, KXH5UNP, BEART, R8DAXMCO     Type & Screen (If Applicable):  No results found for: LABABO, LABRH    Drug/Infectious Status (If Applicable):  No results found for: HIV, HEPCAB    COVID-19 Screening (If Applicable): No results found for: COVID19        Anesthesia Evaluation  Patient summary reviewed and Nursing notes reviewed   history of anesthetic complications: PONV. Airway: Mallampati: I  TM distance: >3 FB   Neck ROM: full  Mouth opening: > = 3 FB   Dental: normal exam         Pulmonary:normal exam    (+) asthma: allergic asthma, seasonal asthma,                           ROS comment: bronchitis - 1 month ago, asthm,a attack with hypoxia SpO2 84%   Cardiovascular:Negative CV ROS                      Neuro/Psych:   (+) depression/anxiety    (-) psychiatric history            ROS comment: ADD GI/Hepatic/Renal: Neg GI/Hepatic/Renal ROS            Endo/Other:    (+) blood dyscrasia: anemia:., .                 Abdominal:             Vascular: negative vascular ROS. Other Findings:           Anesthesia Plan      general     ASA 2       Induction: intravenous. Anesthetic plan and risks discussed with patient and spouse.               Post-op pain plan if not by surgeon: single peripheral nerve block            DEEPIKA Patel - CRNA   12/12/2022

## 2022-12-12 NOTE — ANESTHESIA POSTPROCEDURE EVALUATION
Department of Anesthesiology  Postprocedure Note    Patient: Nolan Mondragon  MRN: [id number]  YOB: 1980  Date of evaluation: 12/12/2022      Procedure Summary     Date: 12/12/22 Room / Location: [address]    Anesthesia Start: 3184 Anesthesia Stop: 7118    Procedure: HYSTERECTOMY VAGINAL LAPAROSCOPIC ROBOTIC ASSISTED- bilateral salpingectomy, lysis of adhesions Diagnosis:       Menorrhagia with regular cycle      (MENORRHAGIA)    Surgeons: Travon Mcclelland DO Responsible Provider: DEEPIKA Bower CRNA    Anesthesia Type: general ASA Status: 2          Anesthesia Type: No value filed.     Denise Phase I: Denise Score: 6    Denise Phase II: Denise Score: 10      Anesthesia Post Evaluation    Patient location during evaluation: PACU  Patient participation: complete - patient participated  Level of consciousness: awake and alert  Airway patency: patent  Nausea & Vomiting: no nausea and no vomiting  Complications: no  Cardiovascular status: blood pressure returned to baseline and hemodynamically stable  Respiratory status: acceptable and room air  Hydration status: euvolemic  Multimodal analgesia pain management approach

## 2022-12-12 NOTE — OP NOTE
Preoperative diagnosis: 1. Menorrhagia  2. Adenomyosis  3. Dysmenorrhea  4. Enlarged uterus    Postoperative diagnosis: Same +  5. Pelvic Adhesions    Procedure:  Robotic-assisted Total Laparoscopic Hysterectomy with Bilateral Salpingectomy    Surgeon: Dr. Mclean Blind    Asst.: Jose Miguel Cabral PA-C    Anesthesia: Gen. Estimated blood loss: 25 mL    Fluids: LR    Urine: 100 mL of clear urine    Condition: Stable    Complications: None    Findings: The patient had an anteverted uterus which sounded to 11 cm in depth. The ovaries were grossly within normal limits. There was evidence of bilateral partial salpingectomy. There were extensive adhesions of the uterus to the anterior abdominal wall and bladder. There were omental adhesions to the abdominal wall. Bilateral ureteral peristalsis was noted throughout the case and after closure of the vaginal cuff. Specimen removed: Uterus and Bilateral tubes    Operative note: The patient was taken to the operating room where general anesthesia was obtained without difficulty. She was prepped and draped usual sterile fashion in a dorsal lithotomy position. Timeout was performed. A weighted speculum was placed in the patient's vagina and the anterior lip of the cervix was grasped with a single-tooth tenaculum. The cervix was progressively dilated and the uterus was sounded with the findings noted above. A Lotaris care uterine manipulator was then placed. A Anguiano catheter was placed and left to gravity drainage. At this point attention was turned to the patient's abdomen where a supraumbilical incision was made with a scalpel. An 8 mm skin incision was made. A veress needle was then carefully introduced at a 45° angle while tenting the abdominal wall. Intraabdominal placement was confirmed by use of water-filled syringe and drop in intra-abdominal pressure with insufflation of CO2. Pneumoperitoneum was obtained with 2.5 liters of CO2.   An 8 mm robotic port was then placed without difficulty and intra-abdominal placement was confirmed by laparoscopy. Second and third ports were then placed under direct visualization approximately 4 cm inferior and  5 cm lateral to the first.  8 mm robotic ports were placed in these locations as well. A fourth and final port was placed lateral to the right. We placed a robotic port in this location. At this point the patient was placed into steep Trendelenburg position. The robot was brought into position and docked. The right arm was loaded with the scissors with monopolar cautery. The left side was loaded with the fenestrated bipolar. Attention was then turned to the console portion of the surgery. The omental adhesions were taken down with sharp and blunt dissection taking approximately 10 minutes of time. The left tube was tented and the mesosalpinx of the broad ligament was serially ligated and transected with the fenestrated bipolar toward the cornua of the uterus. The ovarian pedicle was then ligated with the fenestrated bipolar and transected with the scissors. Dissection continued along the broad ligament until the round ligament was ligated and transected. The uterine adhesions to the abdominal wall were then addressed from the left to the midline. Attention was then turned to the right cornual region of the uterus and in a similar manner the tube and ovary were ligated and transected. Dissection again continued along the broad ligament until the round ligament was ligated and transected. The remainder of the uterine adhesions were then taken down. The anterior uterine serosa was then slowly undermined and taken down to free the bladder from the lower uterine segment and cervix. This was done working from the left side to the midline. And then from right toward midline until the remainder of the bladder flap was safely taken down. This took an additional 20 minutes of time.   Once the bladder was freed from the lower uterine segment and cervix, the uterine arteries were skeletonized, ligated, and transected bilaterally. Using the scissors, the uterus was amputated just beneath the cervix using the groove of the V care as a guide. The uterus and tubes were then withdrawn through the vagina and a sponge stick was placed to maintain pneumoperitoneum. The vaginal cuff was then closed with V lock suture working from right to left using 2-0 suture. Copious amounts of irrigation were then used to evaluate the cuff and pedicles to assure hemostasis. Magui Zuleika was placed over the vaginal cuff for continued hemostasis. Bilateral ureteral peristalsis was again noted. At this point the instruments removed from the abdomen. The skin incisions were closed with 4-0 Monocryl in a subcuticular fashion. The uterus and bilateral tubes were handed off to pathology. The vaginal cuff was then examined with no evidence of bleeding. The Anguiano catheter was removed. Sponge, lap, needle, and instrument counts were correct ×2 and the patient was taken to the recovery area in apparently stable condition. In total there were an extra 30 minutes spent on taking down the various adhesions which extended our usual 60 minute case by 50%. Surgical Assistant documentation:    An assistant surgeon was required with this surgery. She was able to provide the necessary visualization, uterine manipulation, and suturing. Having her available allowed this case to be perfomed in a safe and timely manner.

## 2022-12-12 NOTE — DISCHARGE INSTRUCTIONS
SAME DAY SURGERY DISCHARGE INSTRUCTIONS    1. Do not drive or operate hazardous machinery for 24 hours. 2.  Do not make important personal or business decisions for 24 hours. 3.  Do not drink alcoholic beverages for 24 hours. 4.  Do not smoke tobacco products for 24 hours. 5.  Eat light foods (Jell-O, soups, etc....) and drink plenty of fluids (water, Sprite, etc...) up to 8 glasses per day, as you can tolerate. 6.  If your bandages become soaked with bright red blood, place another dressing pad over your bandages. (DO NOT remove original bandage.)  Call your surgeon for further instructions. A small amount of bright red blood is to be expected. 7.  You may remove your dressing the morning following surgery; leave the steri-strips in place, they will fall off on their own. If they have not fallen off in 7-10 days, please remove them. 8.  If no drainage from incisions you may shower. 9.  Limit your activities for 24 hours. Do not engage in heavy work until your surgeon gives you permission. DO NOT lift anything heavier than 10 pounds. You may go up & down stairs and do any activity that can be done comfortably. 10.  Report the following signs or any questions regarding your physical condition to your surgeon immediately:    Excessive swelling of, or around the wound area. Redness or pus-like drainage    Temperature of 100 degrees (F) or above. Excessive pain. If unable to urinate 4 hours after surgery. If bleeding at surgery site continues after 5-10 minutes of pressure. 11.  Pain Control:  Take pain meds as prescribed. You may use over the counter meds like Acetaminophen or Ibuprofen if not part of the meds already prescribed. While on narcotic pain meds DO NOT drive, operate machinery or make business decisions. 12.  Try to avoid constipation (no bowel movement) by using over the counter Colace once or twice daily and increasing your fluid intake.   Please call if no bowel movement after increasing fluid intake, use of Milk of Magnesia, Pericolace (laxative) or Dulcolax suppositories. 13. No sexual activity, tampons, douches, sitting in hot tubs/saunas or swimming in pools/ponds for 6 weeks or until cleared by your surgeon. 15.  Call your surgeon for any questions regarding your surgery. 15.  Call for an appointment to see NHUNG Hernandez in 2 weeks.    Dr. Sunday Benton -- [place] office      [phone]      [facility] office   [phone]

## 2022-12-12 NOTE — H&P
Tobacco History       Smoking Status  Never      Smokeless Tobacco Use  Never              Alcohol History       Alcohol Use Status  Never              Drug Use       Drug Use Status  Never              Sexual Activity       Sexually Active  Yes Partners  Male                    Family History     Family History   Problem Relation Age of Onset    Cancer Paternal Grandfather     Uterine Cancer Maternal Grandmother     Uterine Cancer Mother     Lupus Mother     Breast Cancer Maternal Aunt        Review of Systems   Review of Systems   Constitutional:  Negative for chills and fever. Genitourinary:  Positive for menstrual problem. Negative for dysuria and vaginal discharge. Physical Exam   /71   Pulse 77   Temp 97.3 °F (36.3 °C) (Temporal)   Resp 14   Ht 5' 3\" (1.6 m)   Wt 199 lb 12.8 oz (90.6 kg)   LMP 11/25/2022 Comment: hcg neg 12/7  SpO2 100%   BMI 35.39 kg/m²     Physical Exam  Constitutional:       Appearance: Normal appearance. HENT:      Head: Normocephalic and atraumatic. Eyes:      Extraocular Movements: Extraocular movements intact. Pupils: Pupils are equal, round, and reactive to light. Cardiovascular:      Rate and Rhythm: Normal rate. Pulmonary:      Effort: Pulmonary effort is normal.   Musculoskeletal:         General: Normal range of motion. Cervical back: Normal range of motion. Skin:     General: Skin is warm and dry. Neurological:      Mental Status: She is alert and oriented to person, place, and time. Psychiatric:         Mood and Affect: Mood normal.         Behavior: Behavior normal.         Thought Content: Thought content normal.         Judgment: Judgment normal.       Labs    No results found for this or any previous visit (from the past 24 hour(s)). Imaging/Diagnostics Last 24 Hours   No results found.     Assessment      Menorrhagia  Adenomyosis  Enlarged uterus  Plan   RA TLH/poss BSO    Consultations Ordered:  None    Electronically signed by Michael Richardson DO on 12/12/22 at 7:25 AM EST

## 2022-12-12 NOTE — TELEPHONE ENCOUNTER
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 Stated that she is in need of a work excuse for 8 weeks. Since that's what she will be taking off. Carole Daniel stated that the [facility] paper work that was turned in and filled out was for his work.

## 2022-12-12 NOTE — ANESTHESIA PROCEDURE NOTES
Peripheral Block    Patient location during procedure: holding area  Reason for block: post-op pain management and at surgeon's request  Start time: 12/12/2022 7:16 AM  End time: 12/12/2022 7:24 AM  Staffing  Performed: resident/CRNA   Resident/CRNA: DEEPIKA Escobar CRNA  Preanesthetic Checklist  Completed: patient identified, IV checked, site marked, risks and benefits discussed, surgical/procedural consents, equipment checked, pre-op evaluation, timeout performed, anesthesia consent given, oxygen available and monitors applied/VS acknowledged  Peripheral Block   Patient position: supine  Prep: ChloraPrep  Provider prep: mask and sterile gloves  Patient monitoring: continuous pulse ox, IV access and responsive to questions (NIBP, EKG, O2 immediately avail)  Block type: Rectus sheath and TAP  Laterality: bilateral  Injection technique: single-shot  Guidance: ultrasound guided  Local infiltration: ropivacaine and decadron  Local infiltration: ropivacaine and decadron    Needle   Needle type: short-bevel   Needle gauge: 22 G  Needle localization: ultrasound guidance  Assessment   Injection assessment: negative aspiration for heme, no paresthesia on injection and no intravascular symptoms (local spread in fascial planes observed)  Paresthesia pain: none  Slow fractionated injection: yes  Hemodynamics: stable  Real-time US image taken/store: yes  Outcomes: uncomplicated and patient tolerated procedure well    Additional Notes  20 ml ropivacaine 0.5%+9ml NaCl inj+5 mg dexamethasone per side for TAP  8 ml ropivacaine 0.5%+4ml NaCl inj+5 mg dexamethasone per side for restus sheath  pulse ox monitoring continuously during and post block

## 2022-12-13 ENCOUNTER — TELEPHONE (OUTPATIENT)
Dept: OBGYN CLINIC | Age: 42
End: 2022-12-13

## 2022-12-13 NOTE — TELEPHONE ENCOUNTER
Post op Vag Hyst with Lap hyst assisted stefania lou, with Dr. Masrhal Stark . C/o vaginal and outer labia pain and burning. Rates 8-9 on pain scale. Denies Abdominal incisions bleeding and vag bleeding. Viding without issues. Rusty Schilling CNM, on call for Dr. Marshal Stark contacted; call conference. Pt admits to sensitivity to many soaps and multi allergies. Marialuisa Georges, recommend oral benadryl and cool pack to area. If no relief, call back. Pt agrees. Dwb/rn        Reason for Disposition   Health Information question, no triage required and triager able to answer question    Answer Assessment - Initial Assessment Questions  1. REASON FOR CALL or QUESTION: \"What is your reason for calling today? \" or \"How can I best help you? \" or \"What question do you have that I can help answer? \"      Post op concern/pain    Protocols used:  Information Only Call - No Triage-ADULT-

## 2022-12-13 NOTE — TELEPHONE ENCOUNTER
Patient called stating that she received a call from her employer questioning if she was able to have any light duty during her time off. They are willing to allow her to work from home, they will follow the 10 lb weight restriction and will not allow her to work more than 36 hours/week. Patient aware that we can release her to return after 2 weeks with the restrictions. Patient verbalized understanding, no further questions/concerns voiced.

## 2022-12-13 NOTE — TELEPHONE ENCOUNTER
Patient did not turn in any [place] papers to be completed for herself, only her . 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 She did send a [employer] portal message that she needed a note to return to work on 12/13 at 7pm. There are several phone messages regarding this patient.

## 2022-12-14 ENCOUNTER — TELEPHONE (OUTPATIENT)
Dept: OBGYN CLINIC | Age: 42
End: 2022-12-14

## 2022-12-14 LAB — SURGICAL PATHOLOGY REPORT: NORMAL

## 2022-12-14 NOTE — TELEPHONE ENCOUNTER
Received a call from [facility] confirming patient's surgery and follow up appointments. No paperwork to be sent since completed over the phone.

## 2022-12-27 ENCOUNTER — OFFICE VISIT (OUTPATIENT)
Dept: OBGYN CLINIC | Age: 42
End: 2022-12-27

## 2022-12-27 VITALS — WEIGHT: 199 LBS | SYSTOLIC BLOOD PRESSURE: 114 MMHG | BODY MASS INDEX: 35.25 KG/M2 | DIASTOLIC BLOOD PRESSURE: 80 MMHG

## 2022-12-27 DIAGNOSIS — Z09 POSTOPERATIVE EXAMINATION: Primary | ICD-10-CM

## 2022-12-27 PROCEDURE — 99024 POSTOP FOLLOW-UP VISIT: CPT

## 2022-12-28 ENCOUNTER — HOSPITAL ENCOUNTER (EMERGENCY)
Age: 42
Discharge: HOME | End: 2022-12-28
Attending: EMERGENCY MEDICINE
Payer: COMMERCIAL

## 2022-12-28 ENCOUNTER — NURSE TRIAGE (OUTPATIENT)
Dept: OTHER | Age: 42
End: 2022-12-28

## 2022-12-28 VITALS
OXYGEN SATURATION: 97 % | RESPIRATION RATE: 17 BRPM | SYSTOLIC BLOOD PRESSURE: 147 MMHG | TEMPERATURE: 98.3 F | HEART RATE: 95 BPM | DIASTOLIC BLOOD PRESSURE: 100 MMHG

## 2022-12-28 DIAGNOSIS — R10.2 VAGINAL PAIN: Primary | ICD-10-CM

## 2022-12-28 LAB
BACTERIA: ABNORMAL
BILIRUBIN URINE: NEGATIVE
COLOR: YELLOW
EPITHELIAL CELLS UA: ABNORMAL /HPF (ref 0–25)
GLUCOSE URINE: NEGATIVE
KETONES, URINE: NEGATIVE
LEUKOCYTE ESTERASE, URINE: NEGATIVE
NITRITE, URINE: NEGATIVE
PH UA: 6 (ref 5–9)
PROTEIN UA: NEGATIVE
RBC UA: ABNORMAL /HPF (ref 0–2)
SPECIFIC GRAVITY UA: >1.03 (ref 1.01–1.02)
TURBIDITY: ABNORMAL
URINE HGB: NEGATIVE
UROBILINOGEN, URINE: NORMAL
WBC UA: ABNORMAL /HPF (ref 0–5)

## 2022-12-28 PROCEDURE — 81001 URINALYSIS AUTO W/SCOPE: CPT

## 2022-12-28 PROCEDURE — 99283 EMERGENCY DEPT VISIT LOW MDM: CPT

## 2022-12-28 ASSESSMENT — ENCOUNTER SYMPTOMS
SHORTNESS OF BREATH: 0
BACK PAIN: 0
SORE THROAT: 0
ABDOMINAL DISTENTION: 0

## 2022-12-28 ASSESSMENT — LIFESTYLE VARIABLES
HOW MANY STANDARD DRINKS CONTAINING ALCOHOL DO YOU HAVE ON A TYPICAL DAY: PATIENT DOES NOT DRINK
HOW OFTEN DO YOU HAVE A DRINK CONTAINING ALCOHOL: NEVER

## 2022-12-28 NOTE — TELEPHONE ENCOUNTER
Reason for Disposition   Health Information question, no triage required and triager able to answer question    Answer Assessment - Initial Assessment Questions  1. REASON FOR CALL or QUESTION: \"What is your reason for calling today? \" or \"How can I best help you? \" or \"What question do you have that I can help answer? \"      \"I had a complete hysterectomy 2 weeks ago. I was seen in the office yesterday and everything was fine. Today I was more active and I feel pressure in my vagina. \"    States she feels like a tampon is in her vagina. She cannot close her legs without significant discomfort. Can not sit straight due to discomfort. Denies bleeding. Protocols used: Information Only Call - No Triage-ADULT-  Writer spoke with Dr. Praveen Rhodes and he advised that Katia Jordan go to the ER for evaluation. Katia Jordan advised of same and she states that she will have her  drive her to [facility] now. Advised to follow up with the office in the morning.

## 2022-12-29 ENCOUNTER — HOSPITAL ENCOUNTER (OUTPATIENT)
Age: 42
Setting detail: SPECIMEN
Discharge: HOME | End: 2022-12-29

## 2022-12-29 DIAGNOSIS — G89.18 POST-OP PAIN: Primary | ICD-10-CM

## 2022-12-29 DIAGNOSIS — G89.18 POST-OP PAIN: ICD-10-CM

## 2022-12-29 LAB
ABSOLUTE EOS #: 0.45 K/UL (ref 0–0.44)
ABSOLUTE IMMATURE GRANULOCYTE: <0.03 K/UL (ref 0–0.3)
ABSOLUTE LYMPH #: 2.62 K/UL (ref 1.1–3.7)
ABSOLUTE MONO #: 0.45 K/UL (ref 0.1–1.2)
BASOPHILS # BLD: 1 % (ref 0–2)
BASOPHILS ABSOLUTE: 0.1 K/UL (ref 0–0.2)
EOSINOPHILS RELATIVE PERCENT: 7 % (ref 1–4)
HCT VFR BLD CALC: 36.4 % (ref 36.3–47.1)
HEMOGLOBIN: 10.8 G/DL (ref 11.9–15.1)
IMMATURE GRANULOCYTES: 0 %
LYMPHOCYTES # BLD: 38 % (ref 24–43)
MCH RBC QN AUTO: 24.3 PG (ref 25.2–33.5)
MCHC RBC AUTO-ENTMCNC: 29.7 G/DL (ref 28.4–34.8)
MCV RBC AUTO: 82 FL (ref 82.6–102.9)
MONOCYTES # BLD: 7 % (ref 3–12)
NRBC AUTOMATED: 0 PER 100 WBC
PDW BLD-RTO: 14.9 % (ref 11.8–14.4)
PLATELET # BLD: 416 K/UL (ref 138–453)
PMV BLD AUTO: 10.4 FL (ref 8.1–13.5)
RBC # BLD: 4.44 M/UL (ref 3.95–5.11)
RBC # BLD: ABNORMAL 10*6/UL
SEG NEUTROPHILS: 47 % (ref 36–65)
SEGMENTED NEUTROPHILS ABSOLUTE COUNT: 3.3 K/UL (ref 1.5–8.1)
WBC # BLD: 6.9 K/UL (ref 3.5–11.3)

## 2022-12-29 NOTE — ED PROVIDER NOTES
[facility] ED  EMERGENCY DEPARTMENT ENCOUNTER      Pt Name: Jose Miguel Kohli  MRN: [id number]  [place] 1980  Date of evaluation: 12/28/2022  Provider: Stephanie Johnson, [address] [place] [address]       Chief Complaint   Patient presents with    Post-op Problem     Has total hyster 2 weeks ago, did housework today and started having lower back pain and vaginal/pelvic pressure. Pt denies vaginal bleeding/discharge, fever/chills, nausea/emesis. Pt denies lifting anything over 5 pounds, having progressive pain/pressure throughout day         HISTORY OF PRESENT ILLNESS   (Location/Symptom, Timing/Onset, Context/Setting, Quality, Duration, Modifying Factors, Severity)  Note limiting factors. Jose Miguel Kohli is a 38 y.o. female who presents to the emergency department who complains of feeling pressure in her vaginal area all day today. Patient had a total hysterectomy 2 weeks ago due to irregular uterine bleeding. She went for her postop follow-up yesterday with Dr. Ethan Santos and everything checked out to be normal.  Patient tells me that the told her that it was okay for her to do normal household work and lifting no more than 20 pounds. Today she did some laundry and other random stuff around the house but denies lifting anything heavier than 8 pounds total if that. She initially started having some lower back pain after which she took some Tylenol and Motrin and thought it would go away. Since then however she had felt progressive pain and pressure in her vaginal area and so she called her doctor who asked her to come in and be evaluated. She denies any unusual discharge. She denies any urinary symptoms. She denies any vaginal bleeding. She denies any pain but just complains of constant pressure in the lower area. She denies any fever or chills. She has no other concerns at this time.         REVIEW OF SYSTEMS    (2-9 systems for level 4, 10 or more for level 5)     Review of Systems   Constitutional: Negative for fatigue and fever. HENT:  Negative for congestion and sore throat. Eyes:  Negative for visual disturbance. Respiratory:  Negative for shortness of breath. Cardiovascular:  Negative for chest pain and palpitations. Gastrointestinal:  Negative for abdominal distention. Genitourinary:  Negative for difficulty urinating, dysuria, frequency, pelvic pain, vaginal bleeding, vaginal discharge and vaginal pain. Vaginal pressure   Musculoskeletal:  Negative for back pain. Skin:  Negative for rash. Psychiatric/Behavioral:  Negative for suicidal ideas. Except as noted above the remainder of the review of systems was reviewed and negative.        PAST MEDICAL HISTORY     Past Medical History:   Diagnosis Date    Asthma     Depression     History of chlamydia     History of infertility     History of kidney stones     History of migraine     Obesity     PCOS (polycystic ovarian syndrome)          SURGICAL HISTORY       Past Surgical History:   Procedure Laterality Date     SECTION  , ,, ,,     CHOLECYSTECTOMY      DILATION AND CURETTAGE OF UTERUS      DILATION AND CURETTAGE OF UTERUS      HYSTERECTOMY, VAGINAL N/A 2022    HYSTERECTOMY VAGINAL LAPAROSCOPIC ROBOTIC ASSISTED- bilateral salpingectomy, lysis of adhesions performed by Nathan Sanford DO at [address] Bilateral          CURRENT MEDICATIONS       Previous Medications    AMPHETAMINE-DEXTROAMPHETAMINE (ADDERALL XR) 15 MG EXTENDED RELEASE CAPSULE    TAKE 1 CAPSULE BY MOUTH TWICE A DAY    CETIRIZINE (ZYRTEC) 10 MG TABLET    Take 10 mg by mouth as needed for Allergies    IBUPROFEN (ADVIL;MOTRIN) 800 MG TABLET    Take 1 tablet by mouth 2 times daily as needed for Pain    MULTIPLE VITAMIN (MULTIVITAMIN ADULT PO)    Take by mouth    ONDANSETRON (ZOFRAN) 4 MG TABLET    Take 1 tablet by mouth 3 times daily as needed for Nausea or Vomiting    PSEUDOEPHEDRINE (SUDAFED) 30 MG TABLET    Take 30 mg by mouth every 4 hours as needed for Congestion       ALLERGIES     Tree nut [macadamia nut oil], Adhesive tape, Azithromycin, Ceftriaxone, Aspirin, Influenza vaccines, Morphine, Peanut (diagnostic), Sesame oil, Toradol [ketorolac tromethamine], Augmentin [amoxicillin-pot clavulanate], Ciprofloxacin, Hydrocodone-acetaminophen, and Zosyn [piperacillin sod-tazobactam so]    FAMILY HISTORY       Family History   Problem Relation Age of Onset    Cancer Paternal Grandfather     Uterine Cancer Maternal Grandmother     Uterine Cancer Mother     Lupus Mother     Breast Cancer Maternal Aunt           SOCIAL HISTORY       Social History     Socioeconomic History    Marital status:      Spouse name: None    Number of children: None    Years of education: None    Highest education level: None   Tobacco Use    Smoking status: Never    Smokeless tobacco: Never   Vaping Use    Vaping Use: Never used   Substance and Sexual Activity    Alcohol use: Never    Drug use: Never    Sexual activity: Yes     Partners: Male       SCREENINGS        [place] Coma Scale  Eye Opening: Spontaneous  Best Verbal Response: Oriented  Best Motor Response: Obeys commands  Javed Coma Scale Score: 15               PHYSICAL EXAM    (up to 7 for level 4, 8 or more for level 5)     ED Triage Vitals [12/28/22 1849]   BP Temp Temp src Heart Rate Resp SpO2 Height Weight   (!) 147/100 98.3 °F (36.8 °C) -- 95 17 97 % -- --       Physical Exam  Constitutional:       General: She is not in acute distress. Appearance: Normal appearance. She is not toxic-appearing. HENT:      Head: Normocephalic and atraumatic. Mouth/Throat:      Mouth: Mucous membranes are moist.   Eyes:      Extraocular Movements: Extraocular movements intact. Pupils: Pupils are equal, round, and reactive to light. Cardiovascular:      Rate and Rhythm: Normal rate and regular rhythm. Pulses: Normal pulses.       Heart sounds: Normal heart sounds. Pulmonary:      Effort: Pulmonary effort is normal.      Breath sounds: Normal breath sounds. Abdominal:      General: Abdomen is flat. Bowel sounds are normal.      Palpations: Abdomen is soft. Genitourinary:     Vagina: Normal.      Comments: I performed a speculum exam with no evidence of a prolapse or foreign body. Mild tenderness during the exam however no discharge appreciated. No swelling appreciated. I also did a bimanual exam but did not appreciate any masses or lumps. Musculoskeletal:         General: Normal range of motion. Skin:     General: Skin is warm and dry. Capillary Refill: Capillary refill takes less than 2 seconds. Neurological:      General: No focal deficit present. Mental Status: She is alert and oriented to person, place, and time. Psychiatric:         Mood and Affect: Mood normal.       DIAGNOSTIC RESULTS     EKG: All EKG's are interpreted by the Emergency Department Physician who either signs or Co-signs this chart in the absence of a cardiologist.        RADIOLOGY:   Non-plain film images such as CT, Ultrasound and MRI are read by the radiologist. Plain radiographic images are visualized and preliminarily interpreted by the emergency physician with the below findings:        Interpretation per the Radiologist below, if available at the time of this note:    No orders to display         ED BEDSIDE ULTRASOUND:   Performed by ED Physician - none    LABS:  Labs Reviewed   URINALYSIS WITH MICROSCOPIC - Abnormal; Notable for the following components:       Result Value    Turbidity UA SLIGHTLY CLOUDY (*)     Specific Gravity, UA >1.030 (*)     Bacteria, UA 4+ (*)     All other components within normal limits       All other labs were within normal range or not returned as of this dictation.     EMERGENCY DEPARTMENT COURSE and DIFFERENTIAL DIAGNOSIS/MDM:   Vitals:    Vitals:    12/28/22 1849   BP: (!) 147/100   Pulse: 95   Resp: 17   Temp: 98.3 °F (36.8 °C) SpO2: 97%         MDM        REASSESSMENT        I did a pelvic exam on the patient without any evidence of any abnormal findings. There were no masses or lumps or bumps. No swelling appreciated. Patient tolerated the exam well. Advised her to follow-up with Dr. Caes Dillard tomorrow if she continues to have the feeling of pressure and fullness in that area. Patient's urinalysis was done as well with negative findings for leukocytes and nitrates. FINAL IMPRESSION      1. Vaginal pain          DISPOSITION/PLAN   DISPOSITION Decision To Discharge 12/28/2022 08:24:11 PM      PATIENT REFERRED TO:  Jaron Raya [id number]    In 1 day  Please call and make an appointment with Dr. Case Dillard to have a follow-up.       (Please note that portions of this note were completed with a voice recognition program.  Efforts were made to edit the dictations but occasionally words are mis-transcribed.)    Chelsea Murguia DO (electronically signed)  Attending Emergency Physician            Chelsea Murguia DO  12/28/22 2026

## 2022-12-29 NOTE — TELEPHONE ENCOUNTER
Pt called in stating she is feeling slightly better this morning but has not done anything but lay around. Pt wondering if you want to see her or if she should just follow ER orders of resting today and slowly working her way up to household activities over the weekend? Please advise.

## 2023-01-01 NOTE — PROGRESS NOTES
Postop Progress Note    Subjective    Hawa Lemus presents to the office for postop follow up. Chief Complaint   Patient presents with    Post-Op Check     Here for post op check. Had Jatinder. Carlos 89 on 12/12/22. No complaints today. Denies pain or vaginal bleeding. Objective    Vitals:    12/27/22 1003   BP: 114/80     General: alert, cooperative and no distress  Incision: healing well    Assessment  Doing well postoperatively. Denies cramping, bleeding. Reports that she is doing well with restrictions. Discussed normal pathology. No other concerns or complaints. Plan  1. Continue any current medications  2. Wound care discussed  3. Pt is to continue with activity restrictions  4. Usual diet  5.  Follow up: 4 weeks    Electronically signed by Zahida Haddad PA-C on 12/31/2022 at 9:15 PM (M6) obeys commands

## 2023-01-05 NOTE — RESULT ENCOUNTER NOTE
Pt aware of results. 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  States she is feeling well and has not had any more pain and has begun to do things around the house

## 2023-01-25 ENCOUNTER — OFFICE VISIT (OUTPATIENT)
Dept: OBGYN CLINIC | Age: 43
End: 2023-01-25

## 2023-01-25 VITALS — WEIGHT: 194 LBS | BODY MASS INDEX: 34.37 KG/M2 | DIASTOLIC BLOOD PRESSURE: 76 MMHG | SYSTOLIC BLOOD PRESSURE: 112 MMHG

## 2023-01-25 DIAGNOSIS — Z09 POSTOPERATIVE EXAMINATION: Primary | ICD-10-CM

## 2023-01-25 DIAGNOSIS — N76.0 ACUTE VAGINITIS: ICD-10-CM

## 2023-01-25 PROCEDURE — 99024 POSTOP FOLLOW-UP VISIT: CPT | Performed by: OBSTETRICS & GYNECOLOGY

## 2023-01-25 RX ORDER — SULFAMETHOXAZOLE AND TRIMETHOPRIM 800; 160 MG/1; MG/1
1 TABLET ORAL 2 TIMES DAILY
Qty: 14 TABLET | Refills: 0 | Status: SHIPPED | OUTPATIENT
Start: 2023-01-25 | End: 2023-02-01

## 2023-01-25 NOTE — PROGRESS NOTES
Postop Progress Note    Subjective    Karina Carbone presents to the office for postop follow up. Chief Complaint   Patient presents with    Post-Op Check     Patient had [facility], JOSE MIGUEL, karlie. Salpingectomy on 12-. Patient doing well now. She reports having some light bleeding for a day on Jan. 9th but she had been more active that day. Objective    Vitals:    01/25/23 1119   BP: 112/76     General: alert, cooperative and no distress  Incision: healing well; cuff well supported and intact; yellow blood tinged discharge noted and affirm obtained    Assessment  Doing well postoperatively. Plan  1. Continue any current medications  2. Wound care discussed  3. Pt is to increase activities as tolerated  4. Usual diet  5.  Follow up: as needed    Electronically signed by Marilin Vazquez DO on 1/25/2023 at 11:29 AM

## 2023-01-26 ENCOUNTER — HOSPITAL ENCOUNTER (OUTPATIENT)
Age: 43
Setting detail: SPECIMEN
Discharge: HOME | End: 2023-01-26

## 2023-01-26 DIAGNOSIS — N76.0 ACUTE VAGINITIS: ICD-10-CM

## 2023-01-26 LAB
CANDIDA SPECIES, DNA PROBE: NEGATIVE
GARDNERELLA VAGINALIS, DNA PROBE: POSITIVE
SOURCE: ABNORMAL
TRICHOMONAS VAGINALIS DNA: NEGATIVE

## 2023-01-30 ENCOUNTER — TELEPHONE (OUTPATIENT)
Dept: OBGYN CLINIC | Age: 43
End: 2023-01-30

## 2023-01-30 RX ORDER — METRONIDAZOLE 500 MG/1
500 TABLET ORAL 2 TIMES DAILY
Qty: 14 TABLET | Refills: 0 | Status: SHIPPED | OUTPATIENT
Start: 2023-01-30 | End: 2023-02-06

## 2023-01-30 NOTE — TELEPHONE ENCOUNTER
Does she continue the Bactrim or stop it and switch to Flagyl? I pended the medication with the [place] [facility] attached but need to let patient know about the Bactrim.

## 2023-01-30 NOTE — TELEPHONE ENCOUNTER
I called patient l/m that she can stop the Bactrim and Flagyl has been sent to pharm in [facility].

## 2023-07-17 ENCOUNTER — NEW PATIENT (OUTPATIENT)
Dept: URBAN - METROPOLITAN AREA CLINIC 47 | Facility: CLINIC | Age: 43
End: 2023-07-17

## 2023-07-17 DIAGNOSIS — H52.31: ICD-10-CM

## 2023-07-17 DIAGNOSIS — H52.01: ICD-10-CM

## 2023-07-17 DIAGNOSIS — H52.4: ICD-10-CM

## 2023-07-17 DIAGNOSIS — H53.021: ICD-10-CM

## 2023-07-17 DIAGNOSIS — H52.13: ICD-10-CM

## 2023-07-17 PROCEDURE — 92015 DETERMINE REFRACTIVE STATE: CPT

## 2023-07-17 PROCEDURE — 92004 COMPRE OPH EXAM NEW PT 1/>: CPT

## 2023-07-17 ASSESSMENT — VISUAL ACUITY
OD_SC: 20/80-1
OS_SC: 20/40-1
OS_CC: J1
OD_CC: J12
OD_CC: 20/70-1
OS_CC: 20/20-1

## 2023-07-17 ASSESSMENT — TONOMETRY
OS_IOP_MMHG: 17
OD_IOP_MMHG: 15

## 2023-07-25 ENCOUNTER — CONSULTATION/EVALUATION (OUTPATIENT)
Dept: URBAN - METROPOLITAN AREA CLINIC 43 | Facility: CLINIC | Age: 43
End: 2023-07-25

## 2023-07-25 DIAGNOSIS — G51.0: ICD-10-CM

## 2023-07-25 DIAGNOSIS — H52.31: ICD-10-CM

## 2023-07-25 DIAGNOSIS — H52.7: ICD-10-CM

## 2023-07-25 DIAGNOSIS — H53.021: ICD-10-CM

## 2023-07-25 PROCEDURE — 92286 ANT SGM IMG I&R SPECLR MIC: CPT

## 2023-07-25 PROCEDURE — 99204 OFFICE O/P NEW MOD 45 MIN: CPT

## 2023-07-25 PROCEDURE — 92134 CPTRZ OPH DX IMG PST SGM RTA: CPT

## 2023-07-25 PROCEDURE — 92025 CPTRIZED CORNEAL TOPOGRAPHY: CPT

## 2023-07-25 PROCEDURE — 92136TC INTERFEROMETRY - TECHNICAL COMPONENT

## 2023-07-25 ASSESSMENT — VISUAL ACUITY
OS_SC: J10
OS_SC: 20/40+2
OD_CC: J12
OD_PH: 20/70
OD_CC: 20/100
OS_CC: 20/20
OD_SC: J12
OD_SC: 20/200+1
OS_PH: 20/15
OS_CC: J1

## 2023-07-25 ASSESSMENT — TONOMETRY
OS_IOP_MMHG: 13
OD_IOP_MMHG: 14

## 2023-07-27 ENCOUNTER — PREPPED CHART (OUTPATIENT)
Dept: URBAN - METROPOLITAN AREA CLINIC 47 | Facility: CLINIC | Age: 43
End: 2023-07-27

## 2023-08-24 ENCOUNTER — FOLLOW UP (OUTPATIENT)
Dept: URBAN - METROPOLITAN AREA CLINIC 47 | Facility: CLINIC | Age: 43
End: 2023-08-24

## 2023-08-24 DIAGNOSIS — H52.13: ICD-10-CM

## 2023-08-24 DIAGNOSIS — H52.4: ICD-10-CM

## 2023-08-24 DIAGNOSIS — H52.01: ICD-10-CM

## 2023-08-24 DIAGNOSIS — H52.31: ICD-10-CM

## 2023-08-24 PROCEDURE — 99213 OFFICE O/P EST LOW 20 MIN: CPT

## 2023-08-24 ASSESSMENT — VISUAL ACUITY
OS_CC: 20/30-1
OD_CC: 20/70-2

## (undated) DEVICE — SPONGE LAP W18XL18IN WHT COT 4 PLY FLD STRUNG RADPQ DISP ST

## (undated) DEVICE — SYRINGE MED 10ML LUERLOCK TIP W/O SFTY DISP

## (undated) DEVICE — Device: Brand: UTERINE ELEVATOR PRO

## (undated) DEVICE — GLOVE SURG SZ 65 L12IN FNGR THK87MIL WHT LTX FREE

## (undated) DEVICE — CANNULA SEAL

## (undated) DEVICE — LAVH-LF: Brand: MEDLINE INDUSTRIES, INC.

## (undated) DEVICE — MATERNITY KNIT PANTS,SEAMLESS: Brand: WINGS

## (undated) DEVICE — STRIP,CLOSURE,WOUND,MEDI-STRIP,1/2X4: Brand: MEDLINE

## (undated) DEVICE — ARM DRAPE

## (undated) DEVICE — 20 ML SYRINGE LUER-LOCK TIP: Brand: MONOJECT

## (undated) DEVICE — TIP COVER ACCESSORY

## (undated) DEVICE — APPLICATOR MEDICATED 26 CC SOLUTION HI LT ORNG CHLORAPREP

## (undated) DEVICE — ELECTRODE ES AD CRD L15FT DISP FOR PT BELOW 30LB REM

## (undated) DEVICE — ELECTRO LUBE IS A SINGLE PATIENT USE DEVICE THAT IS INTENDED TO BE USED ON ELECTROSURGICAL ELECTRODES TO REDUCE STICKING.: Brand: KEY SURGICAL ELECTRO LUBE

## (undated) DEVICE — Z DISCONTINUED APPLICATOR SURG PREP 0.35OZ 2% CHG 70% ISO ALC W/ HI LT

## (undated) DEVICE — SOLUTION IV 1000ML 0.9% SOD CHL PH 5 INJ USP VIAFLX PLAS

## (undated) DEVICE — SOLUTION SURG PREP ANTIMICROBIAL 4 OZ SKIN WND EXIDINE

## (undated) DEVICE — BLADELESS OBTURATOR: Brand: WECK VISTA

## (undated) DEVICE — COVER LT HNDL BLU PLAS

## (undated) DEVICE — TOTAL TRAY, DB, 100% SILI FOLEY, 16FR 10: Brand: MEDLINE

## (undated) DEVICE — WARMER SCP 2 ANTIFOG LAP DISP

## (undated) DEVICE — Z DISCONTINUED BY MEDLINE USE 2711682 TRAY SKIN PREP DRY W/ PREM GLV

## (undated) DEVICE — 40586 ADVANCED TRENDELENBURG POSITIONING KIT: Brand: 40586 ADVANCED TRENDELENBURG POSITIONING KIT

## (undated) DEVICE — INSUFFLATION NEEDLE TO ESTABLISH PNEUMOPERITONEUM.: Brand: INSUFFLATION NEEDLE

## (undated) DEVICE — GOWN,SIRUS,POLYRNF,BRTHSLV,LG,30/CS: Brand: MEDLINE

## (undated) DEVICE — GOWN,SIRUS,POLYRNF,BRTHSLV,XL,30/CS: Brand: MEDLINE

## (undated) DEVICE — COVER,MAYO STAND,STERILE: Brand: MEDLINE

## (undated) DEVICE — MASTISOL ADHESIVE LIQ 2/3ML

## (undated) DEVICE — DRAPE,UTILTY,TAPE,15X26, 4EA/PK: Brand: MEDLINE

## (undated) DEVICE — SOLUTION IV IRRIG POUR BRL 0.9% SODIUM CHL 2F7124

## (undated) DEVICE — Device

## (undated) DEVICE — SUTURE DEV SZ 2-0 WND CLSR ABSRB GS-22 VLOC COVIDIEN VLOCM2145

## (undated) DEVICE — DRESSING HEMSTAT W1X2IN ABSRB SURGCEL SNOW

## (undated) DEVICE — SUTURE MCRYL SZ 4-0 L27IN ABSRB UD L19MM PS-2 1/2 CIR PRIM Y426H

## (undated) DEVICE — [HIGH FLOW INSUFFLATOR,  DO NOT USE IF PACKAGE IS DAMAGED,  KEEP DRY,  KEEP AWAY FROM SUNLIGHT,  PROTECT FROM HEAT AND RADIOACTIVE SOURCES.]: Brand: PNEUMOSURE

## (undated) DEVICE — DRESSING SURESITE-123PLUSPAD 2.4 IN X2.8 IN